# Patient Record
Sex: FEMALE | Race: WHITE | Employment: FULL TIME | URBAN - METROPOLITAN AREA
[De-identification: names, ages, dates, MRNs, and addresses within clinical notes are randomized per-mention and may not be internally consistent; named-entity substitution may affect disease eponyms.]

---

## 2019-10-01 ENCOUNTER — THERAPY VISIT (OUTPATIENT)
Dept: PHYSICAL THERAPY | Facility: CLINIC | Age: 29
End: 2019-10-01
Payer: COMMERCIAL

## 2019-10-01 DIAGNOSIS — G89.29 CHRONIC PAIN OF RIGHT KNEE: ICD-10-CM

## 2019-10-01 DIAGNOSIS — M25.561 CHRONIC PAIN OF RIGHT KNEE: ICD-10-CM

## 2019-10-01 PROCEDURE — 97110 THERAPEUTIC EXERCISES: CPT | Mod: GP | Performed by: PHYSICAL THERAPIST

## 2019-10-01 PROCEDURE — 97162 PT EVAL MOD COMPLEX 30 MIN: CPT | Mod: GP | Performed by: PHYSICAL THERAPIST

## 2019-10-01 PROCEDURE — 97112 NEUROMUSCULAR REEDUCATION: CPT | Mod: GP | Performed by: PHYSICAL THERAPIST

## 2019-10-01 ASSESSMENT — ACTIVITIES OF DAILY LIVING (ADL)
WALK: ACTIVITY IS SOMEWHAT DIFFICULT
HOW_WOULD_YOU_RATE_THE_OVERALL_FUNCTION_OF_YOUR_KNEE_DURING_YOUR_USUAL_DAILY_ACTIVITIES?: ABNORMAL
AS_A_RESULT_OF_YOUR_KNEE_INJURY,_HOW_WOULD_YOU_RATE_YOUR_CURRENT_LEVEL_OF_DAILY_ACTIVITY?: ABNORMAL
STAND: ACTIVITY IS NOT DIFFICULT
SIT WITH YOUR KNEE BENT: ACTIVITY IS FAIRLY DIFFICULT
RAW_SCORE: 49
PAIN: THE SYMPTOM AFFECTS MY ACTIVITY MODERATELY
SQUAT: ACTIVITY IS NOT DIFFICULT
GO DOWN STAIRS: ACTIVITY IS SOMEWHAT DIFFICULT
SWELLING: I HAVE THE SYMPTOM BUT IT DOES NOT AFFECT MY ACTIVITY
KNEE_ACTIVITY_OF_DAILY_LIVING_SUM: 49
KNEE_ACTIVITY_OF_DAILY_LIVING_SCORE: 70
WEAKNESS: I HAVE THE SYMPTOM BUT IT DOES NOT AFFECT MY ACTIVITY
RISE FROM A CHAIR: ACTIVITY IS MINIMALLY DIFFICULT
STIFFNESS: THE SYMPTOM AFFECTS MY ACTIVITY MODERATELY
LIMPING: THE SYMPTOM AFFECTS MY ACTIVITY SLIGHTLY
GO UP STAIRS: ACTIVITY IS SOMEWHAT DIFFICULT
HOW_WOULD_YOU_RATE_THE_CURRENT_FUNCTION_OF_YOUR_KNEE_DURING_YOUR_USUAL_DAILY_ACTIVITIES_ON_A_SCALE_FROM_0_TO_100_WITH_100_BEING_YOUR_LEVEL_OF_KNEE_FUNCTION_PRIOR_TO_YOUR_INJURY_AND_0_BEING_THE_INABILITY_TO_PERFORM_ANY_OF_YOUR_USUAL_DAILY_ACTIVITIES?: 70
KNEEL ON THE FRONT OF YOUR KNEE: ACTIVITY IS NOT DIFFICULT
GIVING WAY, BUCKLING OR SHIFTING OF KNEE: I HAVE THE SYMPTOM BUT IT DOES NOT AFFECT MY ACTIVITY

## 2019-10-01 NOTE — LETTER
[facility]  [address] [address] [place] [address]  [phone]    2019    Re: Suad Larios   :   1990  MRN:  [id number]   REFERRING PHYSICIAN:   Rose Espinoza    [facility]  Date of Initial Evaluation:  10/01/2019  Visits:  Rxs Used: 1  Reason for Referral:  Chronic pain of right knee    EVALUATION SUMMARY    [facility] Initial Evaluation  Subjective:  The history is provided by the patient. No  was used.   Type of problem:  Right knee.   Condition occurred with:  Repetition/overuse. This is a chronic condition   Problem details: 19 pt saw Dr Espinoza for R knee pain. In , she was over training for a marathon and developed severe R knee pain.  She has been unable to run since.  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  .   Patient reports pain:  Lateral. Radiates to:  Thigh. Associated symptoms:  Catching and loss of motion/stiffness. Symptoms are exacerbated by running, walking, descending stairs, bending/squatting and activity (bike, bowl) and relieved by rest.    Suad Larios being seen for R knee pain. Problem began 2019. Where condition occurred: during recreation / sport.Problem occurred: training for a marathon in   Pain score: 2-8/10. General health as reported by patient is good. Pertinent medical history includes:  None.  Medical allergies: other. Other medical allergies details: penecillin, sulfa.  Surgeries include:  None.  Current medications:  Other. Other medications details: birth control.   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  Pain is described as aching and sharp and is constant. Pain is worse during the day. Since onset symptoms are unchanged. Special tests:  MRI. Previous treatment includes physical therapy. There was none improvement following previous treatment.   Patient is . Restrictions include:  Working in  normal job without restrictions.Barriers include:  Bathroom/bedroom on second floor and lives alone.  Red flags:  None as reported by patient.    Objective:  Standing Alignment:    Ankle/foot deviations: mild pes planus R.  Gait:    Gait Type:  Normal     Flexibility/Screens:   Lower Extremity:  Normal         Hip Evaluation  Hip PROM:  : ant hip pain with flex/IR.  Flexion: Left: 120   Right: 120  Abduction: Left: 45    Right: 45  Internal Rotation: Left: 40    Right: 50  External Rotation: Left: 80    Right: 80  Hip Strength:    Flexion:   Left: 5/5   Pain:  Right: 5/5   Pain:  Extension:  Left: 4/5  Pain:Right: 4/5    Pain:    Abduction:  Left: 5/5     Pain:Right: 4/5    Pain:  Adduction:  Left: 5/5    Pain:Right: 5/5   Pain:  Internal Rotation:  Left: 5/5    Pain:Right: 5/5   Pain:  External Rotation:  Left: 5/5   Pain:  Right: 5/5   Pain:  Hip Special Testing:    Right hip positive for the following special tests:  Fadir/LabrumRight hip negative for the following special tests:  Trevor or SLR    Knee Evaluation:  ROM:  Arom wnl knee: good quad set and no extensor lag with SLR.  PROM: normal    Ligament Testing:  Not Assessed  Palpation:    Right knee tenderness present at:  IT Band (and distal/lateral quad)  Edema:  Normal  Mobility Testing:  Normal  Functional Testing:  : unable to provoke sx in clinic so perform trial of Monzon taping (lat to med glide) and pt will monitor at home.  Quad:    Single Leg Squat:  Left:        +  Mild loss of control, decreased hip/trunk flexion, excessive anterior knee excursion, femoral IR and hip substitution  Right:      +  Moderate loss of control, hip substitution, femoral IR, excessive anterior knee excursion and decreased hip/trunk flexion  Bilateral Leg Squat:   Mild loss of control and excessive anterior knee excursion    General Evaluation:  Lower Extremity Flexibility:  normal    Assessment/Plan:    Patient is a 29 year old female with right side hip and right side  knee complaints.    Patient has the following significant findings with corresponding treatment plan.                Diagnosis 1:  R knee: lateral patella facet compression  Pain -  hot/cold therapy and splint/taping/bracing/orthotics  Decreased strength - therapeutic exercise and therapeutic activities  Impaired balance - neuro re-education and therapeutic activities  Decreased proprioception - neuro re-education and therapeutic activities  Inflammation - cold therapy and self management/home program  Impaired muscle performance - neuro re-education  Decreased function - therapeutic activities    Therapy Evaluation Codes:   1) History comprised of:   Personal factors that impact the plan of care:      None.    Comorbidity factors that impact the plan of care are:      None.     Medications impacting care: None.  2) Examination of Body Systems comprised of:   Body structures and functions that impact the plan of care:      Hip and Knee.   Activity limitations that impact the plan of care are:      Jumping, Lifting, Running, Sports, Squatting/kneeling and Stairs.  3) Clinical presentation characteristics are:   Evolving/Changing.  4) Decision-Making    Moderate complexity using standardized patient assessment instrument and/or measureable assessment of functional outcome.  Cumulative Therapy Evaluation is: Moderate complexity.    Previous and current functional limitations:  (See Goal Flow Sheet for this information)    Short term and Long term goals: (See Goal Flow Sheet for this information)     Communication ability:  Patient appears to be able to clearly communicate and understand verbal and written communication and follow directions correctly.  Treatment Explanation - The following has been discussed with the patient:   RX ordered/plan of care  Anticipated outcomes  Possible risks and side effects  This patient would benefit from PT intervention to resume normal activities.   Rehab potential is  good.    Frequency:  1 X week, once daily  Duration:  for 12 weeks  Discharge Plan:  Achieve all LTG.  Independent in home treatment program.  Reach maximal therapeutic benefit.    Thank you for your referral.    INQUIRIES  Therapist: Selina Robison, PT, ATC   [address] [address]  Phone: [phone]  Fax: [phone]

## 2019-10-01 NOTE — PROGRESS NOTES
[facility] Initial Evaluation  Subjective:  The history is provided by the patient. No  was used.   Type of problem:  Right knee   Condition occurred with:  Repetition/overuse. This is a chronic condition   Problem details: 9/24/19 pt saw Dr Espinoza for R knee pain. In 2014, she was over training for a marathon and developed severe R knee pain.  She has been unable to run since.  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  .   Patient reports pain:  Lateral. Radiates to:  Thigh. Associated symptoms:  Catching and loss of motion/stiffness. Symptoms are exacerbated by running, walking, descending stairs, bending/squatting and activity (bike, bowl) and relieved by rest.    Suad Larios being seen for R knee pain.   Problem began 9/24/2019. Where condition occurred: during recreation / sport.Problem occurred: training for a marathon in 2014  Pain score: 2-8/10. General health as reported by patient is good. Pertinent medical history includes:  None.  Medical allergies: other. Other medical allergies details: penecillin, sulfa.  Surgeries include:  None.  Current medications:  Other. Other medications details: birth control.   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  Pain is described as aching and sharp and is constant. Pain is worse during the day. Since onset symptoms are unchanged. Special tests:  MRI. Previous treatment includes physical therapy. There was none improvement following previous treatment.   Patient is . Restrictions include:  Working in normal job without restrictions.    Barriers include:  Bathroom/bedroom on second floor and lives alone.  Red flags:  None as reported by patient.                      Objective:  Standing Alignment:                Ankle/foot deviations: mild pes planus R.    Gait:    Gait Type:  Normal         Flexibility/Screens:           Lower Extremity:  Normal                                                Hip Evaluation  Hip PROM:  : ant hip pain with flex/IR.  Flexion: Left: 120   Right: 120    Abduction: Left: 45    Right: 45    Internal Rotation: Left: 40    Right: 50  External Rotation: Left: 80    Right: 80              Hip Strength:    Flexion:   Left: 5/5   Pain:  Right: 5/5   Pain:                    Extension:  Left: 4/5  Pain:Right: 4/5    Pain:    Abduction:  Left: 5/5     Pain:Right: 4/5    Pain:  Adduction:  Left: 5/5    Pain:Right: 5/5   Pain:  Internal Rotation:  Left: 5/5    Pain:Right: 5/5   Pain:  External Rotation:  Left: 5/5   Pain:  Right: 5/5   Pain:            Hip Special Testing:       Right hip positive for the following special tests:  Fadir/LabrumRight hip negative for the following special tests:  Trevor or SLR           Knee Evaluation:  ROM:  Arom wnl knee: good quad set and no extensor lag with SLR.  PROM: normal              Ligament Testing:  Not Assessed                  Palpation:      Right knee tenderness present at:  IT Band (and distal/lateral quad)  Edema:  Normal    Mobility Testing:  Normal            Functional Testing:  : unable to provoke sx in clinic so perform trial of Monzon taping (lat to med glide) and pt will monitor at home.        Quad:    Single Leg Squat:  Left:        +  Mild loss of control, decreased hip/trunk flexion, excessive anterior knee excursion, femoral IR and hip substitution  Right:      +  Moderate loss of control, hip substitution, femoral IR, excessive anterior knee excursion and decreased hip/trunk flexion  Bilateral Leg Squat:   Mild loss of control and excessive anterior knee excursion                  General Evaluation:          Lower Extremity Flexibility:  normal                                                                             ROS    Assessment/Plan:    Patient is a 29 year old female with right side hip and right side knee complaints.    Patient has the following significant findings with corresponding treatment plan.                 Diagnosis 1:  R knee: lateral patella facet compression  Pain -  hot/cold therapy and splint/taping/bracing/orthotics  Decreased strength - therapeutic exercise and therapeutic activities  Impaired balance - neuro re-education and therapeutic activities  Decreased proprioception - neuro re-education and therapeutic activities  Inflammation - cold therapy and self management/home program  Impaired muscle performance - neuro re-education  Decreased function - therapeutic activities    Therapy Evaluation Codes:   1) History comprised of:   Personal factors that impact the plan of care:      None.    Comorbidity factors that impact the plan of care are:      None.     Medications impacting care: None.  2) Examination of Body Systems comprised of:   Body structures and functions that impact the plan of care:      Hip and Knee.   Activity limitations that impact the plan of care are:      Jumping, Lifting, Running, Sports, Squatting/kneeling and Stairs.  3) Clinical presentation characteristics are:   Evolving/Changing.  4) Decision-Making    Moderate complexity using standardized patient assessment instrument and/or measureable assessment of functional outcome.  Cumulative Therapy Evaluation is: Moderate complexity.    Previous and current functional limitations:  (See Goal Flow Sheet for this information)    Short term and Long term goals: (See Goal Flow Sheet for this information)     Communication ability:  Patient appears to be able to clearly communicate and understand verbal and written communication and follow directions correctly.  Treatment Explanation - The following has been discussed with the patient:   RX ordered/plan of care  Anticipated outcomes  Possible risks and side effects  This patient would benefit from PT intervention to resume normal activities.   Rehab potential is good.    Frequency:  1 X week, once daily  Duration:  for 12 weeks  Discharge Plan:  Achieve all LTG.  Independent in  home treatment program.  Reach maximal therapeutic benefit.    Please refer to the daily flowsheet for treatment today, total treatment time and time spent performing 1:1 timed codes.

## 2019-10-10 ENCOUNTER — THERAPY VISIT (OUTPATIENT)
Dept: PHYSICAL THERAPY | Facility: CLINIC | Age: 29
End: 2019-10-10
Payer: COMMERCIAL

## 2019-10-10 DIAGNOSIS — G89.29 CHRONIC PAIN OF RIGHT KNEE: ICD-10-CM

## 2019-10-10 DIAGNOSIS — M25.561 CHRONIC PAIN OF RIGHT KNEE: ICD-10-CM

## 2019-10-10 PROCEDURE — 97112 NEUROMUSCULAR REEDUCATION: CPT | Mod: GP | Performed by: PHYSICAL THERAPIST

## 2019-10-10 PROCEDURE — 97110 THERAPEUTIC EXERCISES: CPT | Mod: GP | Performed by: PHYSICAL THERAPIST

## 2019-10-10 PROCEDURE — 97140 MANUAL THERAPY 1/> REGIONS: CPT | Mod: GP | Performed by: PHYSICAL THERAPIST

## 2019-10-18 ENCOUNTER — THERAPY VISIT (OUTPATIENT)
Dept: PHYSICAL THERAPY | Facility: CLINIC | Age: 29
End: 2019-10-18
Payer: COMMERCIAL

## 2019-10-18 DIAGNOSIS — G89.29 CHRONIC PAIN OF RIGHT KNEE: ICD-10-CM

## 2019-10-18 DIAGNOSIS — M25.561 CHRONIC PAIN OF RIGHT KNEE: ICD-10-CM

## 2019-10-18 PROCEDURE — 97530 THERAPEUTIC ACTIVITIES: CPT | Mod: GP | Performed by: PHYSICAL THERAPIST

## 2019-10-18 PROCEDURE — 97112 NEUROMUSCULAR REEDUCATION: CPT | Mod: GP | Performed by: PHYSICAL THERAPIST

## 2019-10-18 PROCEDURE — 97140 MANUAL THERAPY 1/> REGIONS: CPT | Mod: GP | Performed by: PHYSICAL THERAPIST

## 2019-10-23 ENCOUNTER — THERAPY VISIT (OUTPATIENT)
Dept: PHYSICAL THERAPY | Facility: CLINIC | Age: 29
End: 2019-10-23
Payer: COMMERCIAL

## 2019-10-23 DIAGNOSIS — M25.561 CHRONIC PAIN OF RIGHT KNEE: ICD-10-CM

## 2019-10-23 DIAGNOSIS — G89.29 CHRONIC PAIN OF RIGHT KNEE: ICD-10-CM

## 2019-10-23 PROCEDURE — 97112 NEUROMUSCULAR REEDUCATION: CPT | Mod: GP | Performed by: PHYSICAL THERAPIST

## 2019-10-23 PROCEDURE — 97110 THERAPEUTIC EXERCISES: CPT | Mod: GP | Performed by: PHYSICAL THERAPIST

## 2019-11-07 ENCOUNTER — THERAPY VISIT (OUTPATIENT)
Dept: PHYSICAL THERAPY | Facility: CLINIC | Age: 29
End: 2019-11-07
Payer: COMMERCIAL

## 2019-11-07 DIAGNOSIS — M25.561 CHRONIC PAIN OF RIGHT KNEE: ICD-10-CM

## 2019-11-07 DIAGNOSIS — G89.29 CHRONIC PAIN OF RIGHT KNEE: ICD-10-CM

## 2019-11-07 PROCEDURE — 97530 THERAPEUTIC ACTIVITIES: CPT | Mod: GP | Performed by: PHYSICAL THERAPIST

## 2019-11-07 PROCEDURE — 97112 NEUROMUSCULAR REEDUCATION: CPT | Mod: GP | Performed by: PHYSICAL THERAPIST

## 2019-11-07 PROCEDURE — 97110 THERAPEUTIC EXERCISES: CPT | Mod: GP | Performed by: PHYSICAL THERAPIST

## 2019-11-07 NOTE — LETTER
[facility]  [address] [address] [place] [address]  [phone]    2019    Re: Suad Larios   :   1990  MRN:  [id number]   REFERRING PHYSICIAN:   Rose Espinoza    [facility]  Date of Initial Evaluation:  10/1/2019  Visits:  Rxs Used: 5  Reason for Referral:  Chronic pain of right knee      PROGRESS  REPORT  Progress reporting period is from 10/1/19 to 19.       SUBJECTIVE  Subjective changes noted by patient:  Frustrated that she is not noticing any change in her R knee.  Continues to have good and bad days.   Pain is ant/lat knee > ITB.    Current pain level is 2-10/10  .    Changes in function:  Yes (See Goal flowsheet attached for changes in current functional level).  Adverse reaction to treatment or activity: None    OBJECTIVE  -Able to walk 3 miles with 3/10 ITB pain after; tried jogging this weekend and at < 1/2 mile she experienced sharp lateral knee pain  -R hip PROM full and pain free except mild ERP with flex/IR and + FADIR  -R hip MMT all 5/5 except glut max is 4/5 B  -Mild TTP to rectus femoris, glut med, and ITB    -R knee PROM full and pain free  -Good quad set and no ext lag with SLR  -No TTP to the R knee  -Good form with 2 leg squat  -SL squat: femoral control improving (mild wobble) but she continues to hike hip excessively  -Monzon taping: lat to med glide=no benefit    -Gait and run assessment: walking=mild decrease in R hip extension and excessive trunk rotation; running=sig hip drop with LEFT stance phase    - I continue to feel Suad recruits gluts poorly and has developed ITB irritation and PFP d/t this.  She does have mild COLEMAN.  HEP is focusing on glutes, LE CKC for PF mechanics, and proprio but we did discuss the possible transfer of her care to our [place] office to work with one of our running specialists for another set of eyes on a walk/run eval and possible use of Alter G      ASSESSMENT/PLAN  Updated  problem list and treatment plan: Diagnosis 1:  R knee/leg pain  Pain -  hot/cold therapy, splint/taping/bracing/orthotics and home program  Decreased strength - therapeutic exercise and therapeutic activities  Decreased proprioception - neuro re-education and therapeutic activities  Inflammation - cold therapy and self management/home program  Impaired gait - gait training  Impaired muscle performance - neuro re-education  Decreased function - therapeutic activities  STG/LTGs have been met or progress has been made towards goals:  Yes (See Goal flow sheet completed today.)  Assessment of Progress: The patient's condition has potential to improve.  Self Management Plans:  Patient has been instructed in a home treatment program.  Patient  has been instructed in self management of symptoms.  I have re-evaluated this patient and find that the nature, scope, duration and intensity of the therapy is appropriate for the medical condition of the patient.  Suad continues to require the following intervention to meet STG and LTG's:  PT    Recommendations:  Pt is planning on setting up an MD humphrey and will also transfer to our [place] clinic for PT.    Thank you for your referral.    INQUIRIES  Therapist: Selina Robison, PT, ATC   [address] [address]  Phone: [phone]  Fax: [phone]

## 2019-11-07 NOTE — PROGRESS NOTES
Subjective:  HPI                    Objective:  System    Physical Exam    General     ROS    Assessment/Plan:    PROGRESS  REPORT    Progress reporting period is from 10/1/19 to 11/7/19.       SUBJECTIVE  Subjective changes noted by patient:  Frustrated that she is not noticing any change in her R knee.  Continues to have good and bad days.   Pain is ant/lat knee > ITB.    Current pain level is 2-10/10  .      Changes in function:  Yes (See Goal flowsheet attached for changes in current functional level)  Adverse reaction to treatment or activity: None    OBJECTIVE  -Able to walk 3 miles with 3/10 ITB pain after; tried jogging this weekend and at < 1/2 mile she experienced sharp lateral knee pain  -R hip PROM full and pain free except mild ERP with flex/IR and + FADIR  -R hip MMT all 5/5 except glut max is 4/5 B  -Mild TTP to rectus femoris, glut med, and ITB    -R knee PROM full and pain free  -Good quad set and no ext lag with SLR  -No TTP to the R knee  -Good form with 2 leg squat  -SL squat: femoral control improving (mild wobble) but she continues to hike hip excessively  -Nicolás taping: lat to med glide=no benefit    -Gait and run assessment: walking=mild decrease in R hip extension and excessive trunk rotation; running=sig hip drop with LEFT stance phase    - I continue to feel Suad recruits gluts poorly and has developed ITB irritation and PFP d/t this.  She does have mild COLEMAN.  HEP is focusing on glutes, LE CKC for PF mechanics, and proprio but we did discuss the possible transfer of her care to our [place] office to work with one of our running specialists for another set of eyes on a walk/run eval and possible use of Alter G      ASSESSMENT/PLAN  Updated problem list and treatment plan: Diagnosis 1:  R knee/leg pain  Pain -  hot/cold therapy, splint/taping/bracing/orthotics and home program  Decreased strength - therapeutic exercise and therapeutic activities  Decreased proprioception - neuro  re-education and therapeutic activities  Inflammation - cold therapy and self management/home program  Impaired gait - gait training  Impaired muscle performance - neuro re-education  Decreased function - therapeutic activities  STG/LTGs have been met or progress has been made towards goals:  Yes (See Goal flow sheet completed today.)  Assessment of Progress: The patient's condition has potential to improve.  Self Management Plans:  Patient has been instructed in a home treatment program.  Patient  has been instructed in self management of symptoms.  I have re-evaluated this patient and find that the nature, scope, duration and intensity of the therapy is appropriate for the medical condition of the patient.  Suad continues to require the following intervention to meet STG and LTG's:  PT    Recommendations:  Pt is planning on setting up an MD humphrey and will also transfer to our [place] clinic for PT.    Please refer to the daily flowsheet for treatment today, total treatment time and time spent performing 1:1 timed codes.

## 2019-12-18 PROBLEM — G89.29 CHRONIC PAIN OF RIGHT KNEE: Status: RESOLVED | Noted: 2019-10-01 | Resolved: 2019-12-18

## 2019-12-18 PROBLEM — M25.561 CHRONIC PAIN OF RIGHT KNEE: Status: RESOLVED | Noted: 2019-10-01 | Resolved: 2019-12-18
